# Patient Record
(demographics unavailable — no encounter records)

---

## 2025-06-04 NOTE — PHYSICAL EXAM
[Well Nourished] : well nourished [Well Developed] : well developed [Well Groomed] : well groomed [Alert] : ~L alert [Active] : active [Normal Breathing Pattern] : normal breathing pattern [No Respiratory Distress] : no respiratory distress [No Drainage] : no drainage [No Conjunctivitis] : no conjunctivitis [Non-Erythematous] : non-erythematous [Absence Of Retractions] : absence of retractions [Symmetric] : symmetric [Good Expansion] : good expansion [No Acc Muscle Use] : no accessory muscle use [Good aeration to bases] : good aeration to bases [Equal Breath Sounds] : equal breath sounds bilaterally [No Crackles] : no crackles [No Rhonchi] : no rhonchi [No Wheezing] : no wheezing [Normal Sinus Rhythm] : normal sinus rhythm [Soft, Non-Tender] : soft, non-tender [No Clubbing] : no clubbing [Capillary Refill < 2 secs] : capillary refill less than two seconds [No Cyanosis] : no cyanosis [No Contractures] : no contractures [Alert and  Oriented] : alert and oriented [No Rashes] : no rashes [FreeTextEntry4] : congested nasal mucosa

## 2025-06-04 NOTE — BIRTH HISTORY
[At ___ Weeks Gestation] : at [unfilled] weeks gestation [ Section] : by  section [None] : there were no delivery complications [Age Appropriate] : age appropriate developmental milestones met [FreeTextEntry4] : NICU stay for 6 days

## 2025-06-04 NOTE — ASSESSMENT
[FreeTextEntry1] : Symptoms of chronic cough and recurrent wheeze with a response to bronchodilators are consistent with mild persistent asthma. Most of the Racquel's exacerbations are probably triggered by allergies and viral illnesses. I would recommend continuing maintenance anti-inflammatory therapy with fluticasone propionate 44 2 puffs once daily along with singulair for several months to decrease airway inflammation, airway reactivity and achieve optimal control of Her asthma symptoms. Black box warning for singulair reviewed with mother. Increase ICS to BID with illness. Safety and efficacy of anti-inflammatory medications and bronchodilators were reviewed. We have demonstrated proper technique for use of an inhaler with a spacer.   Racquel has seasonal/environmental allergies and should continue follow up with allergy- an intranasal steroid and/or an anti-histamine may be helpful in controlling symptoms associated with a post-nasal drip and upper airway cough syndrome. Aggressive control of airway inflammation secondary to allergies would help achieve optimal asthma control.   Importance of yearly flu vaccine and smoking cessation discussed.   Follow up in 2-3 months or sooner PRN.   Plans well received.

## 2025-06-04 NOTE — REVIEW OF SYSTEMS
[Wheezing] : wheezing [Cough] : cough [Immunizations are up to date] : Immunizations are up to date [Poor Appetite] : no poor appetite [Snoring] : no snoring [Apnea] : no apnea [Frequent Croup] : no frequent croup [Nasal Congestion] : no nasal congestion [Sinus Problems] : no sinus problems [Recurrent Ear Infections] : no recurrent ear infections [Heart Disease] : no heart disease [Shortness of Breath] : no shortness of breath [Bronchiolitis] : no bronchiolitis [Eczema] : no ezcema [Influenza Vaccine this Past Year] : no influenza vaccine this past year

## 2025-06-04 NOTE — HISTORY OF PRESENT ILLNESS
[FreeTextEntry1] : Racquel is a 6 yo F  ex 34 weeker who presents for initial pulmonary evaluation for asthma mostly triggered by allergies and viral illnesses. PMD put her on daily singulair 4 mg once daily and fluticasone 44 2 puffs once daily at night, albuterol PRN with improvement. 2 courses of oral steroids in the last year (both times she was not on daily maintenance at the time) Diagnosed with asthma at age: 8 months ago First used nebulizer/albuterol: last year Current asthma medications: singulair  4 mg, fluticasone 44 2 puffs once daily, albuterol PRN  Clinical pneumonia treated with zpak 02/2025 Triggers: viral illnesses and allergies Season: season changes No AOM No SOB with activity when well, +nocturnal cough- once in the last month, no snoring when well No recent CXR No hospitalizations, or ER visits  Modified Asthma Predictive Index: Major risk factors: 1. +parental hx of asthma- dad 2. +allergic rhinitis (trees, cats, dm, pollen and grass) 3. No eczema

## 2025-06-04 NOTE — SOCIAL HISTORY
[Mother] : mother [Father] : father [Sister] : sister [None] : none [Smokers in Household] : there are smokers in the home [de-identified] : dad smokes outside of house